# Patient Record
(demographics unavailable — no encounter records)

---

## 2024-11-25 NOTE — HISTORY OF PRESENT ILLNESS
[FreeTextEntry1] : Ms. DAVIDSON is a 70-year-old female who returns for evaluation with regard to a history of type 2 diabetes mellitus. There is no known history of retinopathy, or nephropathy. With regard to neuropathy, she denies any neurologic s/s.  Current dm medication include Lantus 12 units HS, Metformin 850mg BiD, Januvia 100mg daily, and Glimepiride 4mg twice daily. Overall, tolerating the medications well.  Home glucose monitoring via [employer] 2 has shown values of late to be averaging 140-180, some mornings can be running lower . Notes that BG can be very elevated with slight sugar intake.  She does deny any significant hypoglycemic signs and symptoms of late.  POCT A1C returned today at 7.3%.  POCT glucose today at 114 mg/dL.  She denies polyuria, polydipsia, or any visual changes. She also denies any skin lesions, skin breakdown or non-healing areas of skin. She denies any podiatric concerns. Ophthalmologic evaluation is up to date with Dr. Abdi- no diabetic changes noted; due for f/u in jan 2025   She reports unintentional weight loss. Notes she now only has 2 meals daily (one is a snack), compared to 3 meals/day when she was employed about 4yrs ago.  Does use treadmill and lifts 5lb weights.  current weight 136 lbs previously 140 lbs in August 2024  ____________________________________________________________________________________________________  The patient does also have a history of thyroid nodularity.   Thyroid US from 02/01/2022 showed a 3.4 mm colloid cyst in left lobe- otherwise was normal.   Denies anterior neck discomfort, difficulty swallowing, or any change in the appearance of the neck region. ____________________________________________________________________________________________________  Pt also has hx of osteopenia. Dexa 01/2021 with Lowest T score -2.0 Spine.   Latest DEXA scan from 4/18/24 did show T-scores:  Spine: -2.2, osteopenia (prior -2.0) Femoral neck: -0.7, normal (prior 0) Total hip: 0.6, normal (prior 0.2)  Pt also has vit D deficiency, currently taking OTC vitamin D3 4,000 IU  Denies fracture hx or kidney stones.    ____________________________________________________________________________________________________  Additional medical history includes that of hypercholesterolemia and hypertension.  - following with heme in regard to lymphocyte elevation  - In August 2024 saw onc Dr. Reyes regarding right breast fibroadenoma; no intervention needed and is to f/u in one year.   Additional Medications: Atorvastatin 40 mg daily, Amlodipine 5 mg, Valsartan-hctz, and ASA 81 mg

## 2024-11-25 NOTE — ADDENDUM
[FreeTextEntry1] : poct glucose and a1c were carried out today given diabetes diagnosis blood will be drawn in the office today  This note was written by Devendra Doll on 11/25/2024 acting as medical scribe for Dr. Juventino Bourne. I, Dr. Juventino Bourne, have read and attest that all the information, medical decision making and discharge instructions within are true and accurate.

## 2025-02-03 NOTE — HISTORY OF PRESENT ILLNESS
[FreeTextEntry1] : This is a 70 y/o female with a pmhx of HTN, HLD, DM, INDRA, presents today for annual wellness visit Patient denies chest pain, dyspnea, palpitations, dizziness, syncope, changes in bowel/bladder habits or appetite.

## 2025-02-03 NOTE — PHYSICAL EXAM

## 2025-05-06 NOTE — HISTORY OF PRESENT ILLNESS
[de-identified] : 69 y/o F w/ h/o right PM for IDP in , right 12N5 fibroadenoma. Here for follow up.  Referred by: Dr. Carter Gibbs   24 (Initial consult) Right FA was biopsied in  and has been unchanged on recent imaging.  Denies pain at right FA site.   She denies any palpable masses, nipple discharge or skin changes.   Reports hot flashes ~10x/day, waking her up at night approx 4 times per night.   s/p surgical menopause .  25 Here for follow up. Recent screening MG/US rated birads 2 for stable R 12N5 bx-proven fibroadenoma. No new breast complaints or symptoms.    PMHx: HTN, HLD, DM2 (insulin dependent, A1C 7.5 on 2024), INDRA.  PSHx: Right lumpectomy on 2020.  x2, BL BSO, MICKEY  Meds: Rosuvastatin 40 mg, amlodipine 5 mg, Valsartan-HCTZ 320-25 mg. ASA 81 mg, Lantus 16 units HS, Metformin 850 mg bid, Januvia 100 mg daily, and Glimepiride 4 mg twice daily, omeprazole 20 mg. Vitamin D.  Allergies: PCN- Hives  Family Hx: No breast or ovarian cancer. Prostate cancer (Father, Brother x2), Vaginal cancer (Paternal first cousin).  GYN: , menarche age 14, Surgical menopause. Age at first pregnancy 27.  N.  Bra size: 36 D  Occupation: Retired RN  Social: Smoking: Denies      ETOH: Denies

## 2025-05-06 NOTE — RESULTS/DATA
[FreeTextEntry1] : BREAST PATH/RAD REVIEW.  [facility].  2/20/20 s/p Right denice loc lumpectomy (Dr. Seaman) - IDP, prior bx-site changes. Other benign changes.  1/25/21 BL SC MG/US (TC 6.8%): birads 0. HD. L UIQ 1.0 cm oval mass on MG (Rec L Dx US). R 4N6 post-surgical scaring. R 11:00 & L 11-12:00 sub-cm cysts.  1/27/21 L Dx US: birads 2. L 10N11 0.7 cm ovoid cyst corresponding to mammographic mass, benign.   4/15/22 BL SC MG/US: birads 2. HD.  - Stable L posterior medial ovoid nodule previously demonstrated to be a cyst.  - R 4N6 post-surgical scaring, R 11:00 & L 11:00 sub-cm cysts.  - L 10N11 0.9 cm ovoid cyst slightly increased in size.   4/17/23 BL SC MG/US (TC 3.9%): birads 0. SFGD.   - Stable L slightly inner posterior nodular asymmetry prev demonstrated to be a cyst.   - Scattered benign type calcs. L axillary tail low-lying LN.  - Stable R post-operative scar. R 11:00 0.4 cm cyst. New R 12N5 0.8 cm circumscribed hypoechoic mass (Rec R Dx US).  - Stable L 10N11 0.9 cm hypoechoic mass.   4/20/23 R Dx US: birads 4A. R 12N5 0.8 cm mass (Rec US guided bx) 5/11/23 Right [12N5] US guided core bx: Benign breast tissue showing fibroadenoma. Benign and concordant. Ribbon Clip. (Rec 1 year R Dx US at time of annual MG).   4/18/24 BL SC MG/US: birads 2. SFGD.   - Stable R post-surgical changes. R central upper bx marker. R 4N6 post-surgical changes.  - Stable prev biopsied R 12N5 0.9 cm hypoechoic mass. Stable L 10N11 0.8 cm hypoechoic mass.   4/21/25 BL SC MG/US (TC 3.8%): birads 2. SFGD.  - Stable R 12N 5 0.7 cm mass w/ bx marker.  - Stable L 10N11 0.7 cm mass.  - Stable R post-surgical changes.  - Scattered benign type calcs.  - Stable L focal nodular asymmetry.

## 2025-05-06 NOTE — PHYSICAL EXAM
[Normocephalic] : normocephalic [Atraumatic] : atraumatic [Supple] : supple [No Supraclavicular Adenopathy] : no supraclavicular adenopathy [No Cervical Adenopathy] : no cervical adenopathy [Examined in the supine and seated position] : examined in the supine and seated position [Symmetrical] : symmetrical [No Axillary Lymphadenopathy] : no left axillary lymphadenopathy [Full ROM] : full range of motion [No Rashes] : no rashes [No Ulceration] : no ulceration [de-identified] : Normal respiratory effort [de-identified] : no palpable masses in either breast.  no clinical lymphadenopathy

## 2025-05-06 NOTE — ASSESSMENT
[FreeTextEntry1] : 69 y/o F w/ h/o right PM for IDP in 2020, right 12N5 fibroadenoma. Here for follow up.   We discussed her clinical breast exam today is unremarkable with no clinical evidence of disease.   I reviewed her most recent breast imaging with no significant findings. Next imaging:  BL SC MG/US due 4/2026  She will resume her annual breast imaging surveillance with her PCP/GYN and she will return to see me if any breast imaging abnormalities or breast concerns arise.

## 2025-05-12 NOTE — ADDENDUM
[FreeTextEntry1] : POCT glucose testing and Hemoglobin A1c was carried out today given diabetes diagnosis.  This note was written by Elise Lilly on 05/12/2025 acting as medical scribe for Dr. Juventino Bourne. I, Dr. Juventino Bourne, have read and attest that all the information, medical decision making and discharge instructions within are true and accurate.

## 2025-05-12 NOTE — HISTORY OF PRESENT ILLNESS
[FreeTextEntry1] : Ms. DAVIDSON is a 70-year-old female who returns for evaluation with regard to a history of type 2 diabetes mellitus. There is no known history of retinopathy, or nephropathy. With regard to neuropathy, she denies any neurologic s/s.  Additional medical history includes that of hypercholesterolemia and hypertension.  - Following with heme in regard to lymphocyte elevation. - In August 2024 saw onc Dr. Reyes regarding right breast fibroadenoma; no intervention needed and is to f/u in one year.   Current dm medication include Lantus 16 units HS, Metformin 850mg BiD, Januvia 100mg daily, and Glimepiride 4mg twice daily. Overall, tolerating the medications well. - Never used GLP1 agonists before.  Home glucose monitoring via [employer] 3 has shown values of late to be averaging 154 with a variability of 27.5%.  She reports low BG at least 1-2x weekly.  Tries to watch carb intake.  She makes an effort to eat less as she is concerned about her BG.  In March 2025, she had an upper respiratory infection for 2 weeks and did not take any abx or steroids for it. POCT A1C returned today at 7.6%. Previously returned at 7.6% in February 2025. POCT glucose today at 143 mg/dL.  She denies polyuria, polydipsia, or any visual changes. She also denies any skin lesions, skin breakdown or non-healing areas of skin. She denies any podiatric concerns. Ophthalmologic evaluation is up to date with Dr. Abdi- no diabetic changes noted.   Notes she now only has 2 meals daily (one is a snack), compared to 3 meals/day when she was employed about 4yrs ago.  Does use treadmill and lifts 5lb weights.  Current weight 140 lbs Previously 136 lbs in November 2024  ____________________________________________________________________________________________________  The patient does also have a history of thyroid nodularity.   Thyroid US from 02/01/2022 showed a 3.4 mm colloid cyst in left lobe- otherwise was normal.   Denies anterior neck discomfort, difficulty swallowing, or any change in the appearance of the neck region. ____________________________________________________________________________________________________  Pt also has hx of osteopenia. Dexa 01/2021 with Lowest T score -2.0 Spine.   Latest DEXA scan from 4/18/24 did show T-scores:  Spine: -2.2, osteopenia (prior -2.0) Femoral neck: -0.7, normal (prior 0) Total hip: 0.6, normal (prior 0.2)  Pt also has vit D deficiency, currently taking OTC vitamin D3 4,000 IU  Denies fracture hx or kidney stones.    ____________________________________________________________________________________________________  Additional Medications: Atorvastatin 40 mg daily, Amlodipine 5 mg, Valsartan-hctz, and ASA 81 mg  Taking vitamin D3 2000 IU daily